# Patient Record
Sex: FEMALE | Race: WHITE | NOT HISPANIC OR LATINO | URBAN - METROPOLITAN AREA
[De-identification: names, ages, dates, MRNs, and addresses within clinical notes are randomized per-mention and may not be internally consistent; named-entity substitution may affect disease eponyms.]

---

## 2017-04-21 ENCOUNTER — OUTPATIENT (OUTPATIENT)
Dept: OUTPATIENT SERVICES | Facility: HOSPITAL | Age: 82
LOS: 1 days | Discharge: HOME | End: 2017-04-21

## 2017-06-27 DIAGNOSIS — E89.0 POSTPROCEDURAL HYPOTHYROIDISM: ICD-10-CM

## 2017-06-27 DIAGNOSIS — E78.5 HYPERLIPIDEMIA, UNSPECIFIED: ICD-10-CM

## 2017-06-27 DIAGNOSIS — E55.9 VITAMIN D DEFICIENCY, UNSPECIFIED: ICD-10-CM

## 2017-06-27 DIAGNOSIS — Z79.899 OTHER LONG TERM (CURRENT) DRUG THERAPY: ICD-10-CM

## 2017-06-27 DIAGNOSIS — E10.9 TYPE 1 DIABETES MELLITUS WITHOUT COMPLICATIONS: ICD-10-CM

## 2017-06-27 DIAGNOSIS — D64.9 ANEMIA, UNSPECIFIED: ICD-10-CM

## 2017-06-27 DIAGNOSIS — M10.9 GOUT, UNSPECIFIED: ICD-10-CM

## 2017-06-27 DIAGNOSIS — N39.0 URINARY TRACT INFECTION, SITE NOT SPECIFIED: ICD-10-CM

## 2017-06-27 DIAGNOSIS — D51.8 OTHER VITAMIN B12 DEFICIENCY ANEMIAS: ICD-10-CM

## 2018-03-23 ENCOUNTER — OUTPATIENT (OUTPATIENT)
Dept: OUTPATIENT SERVICES | Facility: HOSPITAL | Age: 83
LOS: 1 days | Discharge: HOME | End: 2018-03-23

## 2018-03-23 DIAGNOSIS — N39.0 URINARY TRACT INFECTION, SITE NOT SPECIFIED: ICD-10-CM

## 2018-03-23 DIAGNOSIS — E78.5 HYPERLIPIDEMIA, UNSPECIFIED: ICD-10-CM

## 2018-03-23 DIAGNOSIS — E10.9 TYPE 1 DIABETES MELLITUS WITHOUT COMPLICATIONS: ICD-10-CM

## 2018-03-23 DIAGNOSIS — E89.0 POSTPROCEDURAL HYPOTHYROIDISM: ICD-10-CM

## 2018-03-23 DIAGNOSIS — E55.9 VITAMIN D DEFICIENCY, UNSPECIFIED: ICD-10-CM

## 2018-03-23 DIAGNOSIS — D64.9 ANEMIA, UNSPECIFIED: ICD-10-CM

## 2018-03-23 DIAGNOSIS — D51.8 OTHER VITAMIN B12 DEFICIENCY ANEMIAS: ICD-10-CM

## 2018-03-23 DIAGNOSIS — Z79.899 OTHER LONG TERM (CURRENT) DRUG THERAPY: ICD-10-CM

## 2018-03-23 DIAGNOSIS — M10.9 GOUT, UNSPECIFIED: ICD-10-CM

## 2019-06-05 ENCOUNTER — OUTPATIENT (OUTPATIENT)
Dept: OUTPATIENT SERVICES | Facility: HOSPITAL | Age: 84
LOS: 1 days | Discharge: HOME | End: 2019-06-05

## 2019-06-05 DIAGNOSIS — N39.0 URINARY TRACT INFECTION, SITE NOT SPECIFIED: ICD-10-CM

## 2019-06-05 DIAGNOSIS — E87.0 HYPEROSMOLALITY AND HYPERNATREMIA: ICD-10-CM

## 2019-06-05 DIAGNOSIS — E78.5 HYPERLIPIDEMIA, UNSPECIFIED: ICD-10-CM

## 2019-06-05 DIAGNOSIS — M10.9 GOUT, UNSPECIFIED: ICD-10-CM

## 2019-06-05 DIAGNOSIS — Z79.899 OTHER LONG TERM (CURRENT) DRUG THERAPY: ICD-10-CM

## 2019-06-05 DIAGNOSIS — D64.9 ANEMIA, UNSPECIFIED: ICD-10-CM

## 2019-06-05 DIAGNOSIS — D51.8 OTHER VITAMIN B12 DEFICIENCY ANEMIAS: ICD-10-CM

## 2019-06-05 DIAGNOSIS — Z02.9 ENCOUNTER FOR ADMINISTRATIVE EXAMINATIONS, UNSPECIFIED: ICD-10-CM

## 2019-07-01 ENCOUNTER — OUTPATIENT (OUTPATIENT)
Dept: OUTPATIENT SERVICES | Facility: HOSPITAL | Age: 84
LOS: 1 days | Discharge: HOME | End: 2019-07-01

## 2019-07-01 DIAGNOSIS — D51.8 OTHER VITAMIN B12 DEFICIENCY ANEMIAS: ICD-10-CM

## 2019-07-01 DIAGNOSIS — E78.5 HYPERLIPIDEMIA, UNSPECIFIED: ICD-10-CM

## 2019-07-01 DIAGNOSIS — E55.9 VITAMIN D DEFICIENCY, UNSPECIFIED: ICD-10-CM

## 2019-07-01 DIAGNOSIS — E10.9 TYPE 1 DIABETES MELLITUS WITHOUT COMPLICATIONS: ICD-10-CM

## 2019-07-01 DIAGNOSIS — M10.9 GOUT, UNSPECIFIED: ICD-10-CM

## 2019-07-01 DIAGNOSIS — Z79.899 OTHER LONG TERM (CURRENT) DRUG THERAPY: ICD-10-CM

## 2019-07-01 DIAGNOSIS — E89.0 POSTPROCEDURAL HYPOTHYROIDISM: ICD-10-CM

## 2019-07-01 DIAGNOSIS — D64.9 ANEMIA, UNSPECIFIED: ICD-10-CM

## 2021-01-01 ENCOUNTER — INPATIENT (INPATIENT)
Facility: HOSPITAL | Age: 86
LOS: 0 days | End: 2021-02-21
Attending: INTERNAL MEDICINE | Admitting: INTERNAL MEDICINE
Payer: MEDICARE

## 2021-01-01 VITALS — OXYGEN SATURATION: 100 % | HEART RATE: 105 BPM

## 2021-01-01 VITALS — RESPIRATION RATE: 16 BRPM | OXYGEN SATURATION: 100 % | HEART RATE: 110 BPM

## 2021-01-01 DIAGNOSIS — E89.0 POSTPROCEDURAL HYPOTHYROIDISM: Chronic | ICD-10-CM

## 2021-01-01 DIAGNOSIS — Z95.5 PRESENCE OF CORONARY ANGIOPLASTY IMPLANT AND GRAFT: Chronic | ICD-10-CM

## 2021-01-01 DIAGNOSIS — R09.89 OTHER SPECIFIED SYMPTOMS AND SIGNS INVOLVING THE CIRCULATORY AND RESPIRATORY SYSTEMS: ICD-10-CM

## 2021-01-01 DIAGNOSIS — Z95.828 PRESENCE OF OTHER VASCULAR IMPLANTS AND GRAFTS: Chronic | ICD-10-CM

## 2021-01-01 LAB
ALBUMIN SERPL ELPH-MCNC: 3.1 G/DL — LOW (ref 3.5–5.2)
ALP SERPL-CCNC: 93 U/L — SIGNIFICANT CHANGE UP (ref 30–115)
ALT FLD-CCNC: <5 U/L — SIGNIFICANT CHANGE UP (ref 0–41)
ANION GAP SERPL CALC-SCNC: 13 MMOL/L — SIGNIFICANT CHANGE UP (ref 7–14)
APAP SERPL-MCNC: <5 UG/ML — LOW (ref 10–30)
APPEARANCE UR: CLEAR — SIGNIFICANT CHANGE UP
APTT BLD: 22.9 SEC — CRITICAL LOW (ref 27–39.2)
AST SERPL-CCNC: 24 U/L — SIGNIFICANT CHANGE UP (ref 0–41)
BACTERIA # UR AUTO: NEGATIVE — SIGNIFICANT CHANGE UP
BASE EXCESS BLDV CALC-SCNC: 3.7 MMOL/L — HIGH (ref -2–2)
BASOPHILS # BLD AUTO: 0.09 K/UL — SIGNIFICANT CHANGE UP (ref 0–0.2)
BASOPHILS NFR BLD AUTO: 0.2 % — SIGNIFICANT CHANGE UP (ref 0–1)
BILIRUB SERPL-MCNC: 0.4 MG/DL — SIGNIFICANT CHANGE UP (ref 0.2–1.2)
BILIRUB UR-MCNC: NEGATIVE — SIGNIFICANT CHANGE UP
BUN SERPL-MCNC: 64 MG/DL — CRITICAL HIGH (ref 10–20)
CA-I SERPL-SCNC: 1.54 MMOL/L — HIGH (ref 1.12–1.3)
CALCIUM SERPL-MCNC: 11.7 MG/DL — HIGH (ref 8.5–10.1)
CHLORIDE SERPL-SCNC: 108 MMOL/L — SIGNIFICANT CHANGE UP (ref 98–110)
CK SERPL-CCNC: 366 U/L — HIGH (ref 0–225)
CO2 SERPL-SCNC: 25 MMOL/L — SIGNIFICANT CHANGE UP (ref 17–32)
COLOR SPEC: YELLOW — SIGNIFICANT CHANGE UP
CREAT SERPL-MCNC: 1.5 MG/DL — SIGNIFICANT CHANGE UP (ref 0.7–1.5)
DIFF PNL FLD: SIGNIFICANT CHANGE UP
EOSINOPHIL # BLD AUTO: 0.01 K/UL — SIGNIFICANT CHANGE UP (ref 0–0.7)
EOSINOPHIL NFR BLD AUTO: 0 % — SIGNIFICANT CHANGE UP (ref 0–8)
EPI CELLS # UR: 1 /HPF — SIGNIFICANT CHANGE UP (ref 0–5)
GAS PNL BLDV: 148 MMOL/L — HIGH (ref 136–145)
GAS PNL BLDV: SIGNIFICANT CHANGE UP
GLUCOSE BLDC GLUCOMTR-MCNC: 108 MG/DL — HIGH (ref 70–99)
GLUCOSE SERPL-MCNC: 118 MG/DL — HIGH (ref 70–99)
GLUCOSE UR QL: NEGATIVE — SIGNIFICANT CHANGE UP
HCO3 BLDV-SCNC: 33 MMOL/L — HIGH (ref 22–29)
HCT VFR BLD CALC: 37.8 % — SIGNIFICANT CHANGE UP (ref 37–47)
HCT VFR BLDA CALC: 35.6 % — SIGNIFICANT CHANGE UP (ref 34–44)
HGB BLD CALC-MCNC: 11.6 G/DL — LOW (ref 14–18)
HGB BLD-MCNC: 11 G/DL — LOW (ref 12–16)
HYALINE CASTS # UR AUTO: 15 /LPF — HIGH (ref 0–7)
IMM GRANULOCYTES NFR BLD AUTO: 0.8 % — HIGH (ref 0.1–0.3)
INR BLD: 1.2 RATIO — SIGNIFICANT CHANGE UP (ref 0.65–1.3)
KETONES UR-MCNC: NEGATIVE — SIGNIFICANT CHANGE UP
LACTATE BLDV-MCNC: 1.7 MMOL/L — HIGH (ref 0.5–1.6)
LACTATE SERPL-SCNC: 2.1 MMOL/L — HIGH (ref 0.7–2)
LEUKOCYTE ESTERASE UR-ACNC: NEGATIVE — SIGNIFICANT CHANGE UP
LYMPHOCYTES # BLD AUTO: 17.16 K/UL — HIGH (ref 1.2–3.4)
LYMPHOCYTES # BLD AUTO: 45.6 % — SIGNIFICANT CHANGE UP (ref 20.5–51.1)
MCHC RBC-ENTMCNC: 23.9 PG — LOW (ref 27–31)
MCHC RBC-ENTMCNC: 29.1 G/DL — LOW (ref 32–37)
MCV RBC AUTO: 82 FL — SIGNIFICANT CHANGE UP (ref 81–99)
MONOCYTES # BLD AUTO: 0.9 K/UL — HIGH (ref 0.1–0.6)
MONOCYTES NFR BLD AUTO: 2.4 % — SIGNIFICANT CHANGE UP (ref 1.7–9.3)
NEUTROPHILS # BLD AUTO: 19.18 K/UL — HIGH (ref 1.4–6.5)
NEUTROPHILS NFR BLD AUTO: 51 % — SIGNIFICANT CHANGE UP (ref 42.2–75.2)
NITRITE UR-MCNC: NEGATIVE — SIGNIFICANT CHANGE UP
NRBC # BLD: 0 /100 WBCS — SIGNIFICANT CHANGE UP (ref 0–0)
PCO2 BLDV: 75 MMHG — HIGH (ref 41–51)
PH BLDV: 7.25 — LOW (ref 7.26–7.43)
PH UR: 5.5 — SIGNIFICANT CHANGE UP (ref 5–8)
PLATELET # BLD AUTO: 65 K/UL — LOW (ref 130–400)
PO2 BLDV: 34 MMHG — SIGNIFICANT CHANGE UP (ref 20–40)
POTASSIUM BLDV-SCNC: 4.6 MMOL/L — SIGNIFICANT CHANGE UP (ref 3.3–5.6)
POTASSIUM SERPL-MCNC: 5.1 MMOL/L — HIGH (ref 3.5–5)
POTASSIUM SERPL-SCNC: 5.1 MMOL/L — HIGH (ref 3.5–5)
PROT SERPL-MCNC: 6.2 G/DL — SIGNIFICANT CHANGE UP (ref 6–8)
PROT UR-MCNC: ABNORMAL
PROTHROM AB SERPL-ACNC: 13.8 SEC — HIGH (ref 9.95–12.87)
RBC # BLD: 4.61 M/UL — SIGNIFICANT CHANGE UP (ref 4.2–5.4)
RBC # FLD: 17.3 % — HIGH (ref 11.5–14.5)
RBC CASTS # UR COMP ASSIST: 7 /HPF — HIGH (ref 0–4)
SALICYLATES SERPL-MCNC: <0.3 MG/DL — LOW (ref 4–30)
SAO2 % BLDV: 45 % — SIGNIFICANT CHANGE UP
SARS-COV-2 RNA SPEC QL NAA+PROBE: SIGNIFICANT CHANGE UP
SODIUM SERPL-SCNC: 146 MMOL/L — SIGNIFICANT CHANGE UP (ref 135–146)
SP GR SPEC: 1.03 — SIGNIFICANT CHANGE UP (ref 1.01–1.03)
TROPONIN T SERPL-MCNC: 0.15 NG/ML — CRITICAL HIGH
UROBILINOGEN FLD QL: SIGNIFICANT CHANGE UP
WBC # BLD: 37.65 K/UL — HIGH (ref 4.8–10.8)
WBC # FLD AUTO: 37.65 K/UL — HIGH (ref 4.8–10.8)
WBC UR QL: 4 /HPF — SIGNIFICANT CHANGE UP (ref 0–5)

## 2021-01-01 PROCEDURE — 71045 X-RAY EXAM CHEST 1 VIEW: CPT | Mod: 26

## 2021-01-01 PROCEDURE — 99291 CRITICAL CARE FIRST HOUR: CPT | Mod: 25,GC

## 2021-01-01 PROCEDURE — 31500 INSERT EMERGENCY AIRWAY: CPT | Mod: GC

## 2021-01-01 PROCEDURE — 99222 1ST HOSP IP/OBS MODERATE 55: CPT

## 2021-01-01 PROCEDURE — 93010 ELECTROCARDIOGRAM REPORT: CPT

## 2021-01-01 PROCEDURE — 70450 CT HEAD/BRAIN W/O DYE: CPT | Mod: 26

## 2021-01-01 PROCEDURE — 93925 LOWER EXTREMITY STUDY: CPT | Mod: 26

## 2021-01-01 RX ORDER — SODIUM CHLORIDE 9 MG/ML
1000 INJECTION, SOLUTION INTRAVENOUS ONCE
Refills: 0 | Status: COMPLETED | OUTPATIENT
Start: 2021-01-01 | End: 2021-01-01

## 2021-01-01 RX ORDER — ACETAMINOPHEN 500 MG
650 TABLET ORAL ONCE
Refills: 0 | Status: COMPLETED | OUTPATIENT
Start: 2021-01-01 | End: 2021-01-01

## 2021-01-01 RX ORDER — VANCOMYCIN HCL 1 G
1000 VIAL (EA) INTRAVENOUS ONCE
Refills: 0 | Status: COMPLETED | OUTPATIENT
Start: 2021-01-01 | End: 2021-01-01

## 2021-01-01 RX ORDER — MIDAZOLAM HYDROCHLORIDE 1 MG/ML
0.02 INJECTION, SOLUTION INTRAMUSCULAR; INTRAVENOUS
Qty: 100 | Refills: 0 | Status: DISCONTINUED | OUTPATIENT
Start: 2021-01-01 | End: 2021-01-01

## 2021-01-01 RX ORDER — HEPARIN SODIUM 5000 [USP'U]/ML
5000 INJECTION INTRAVENOUS; SUBCUTANEOUS EVERY 8 HOURS
Refills: 0 | Status: DISCONTINUED | OUTPATIENT
Start: 2021-01-01 | End: 2021-01-01

## 2021-01-01 RX ORDER — ACETAMINOPHEN 500 MG
650 TABLET ORAL EVERY 6 HOURS
Refills: 0 | Status: DISCONTINUED | OUTPATIENT
Start: 2021-01-01 | End: 2021-01-01

## 2021-01-01 RX ORDER — ROCURONIUM BROMIDE 10 MG/ML
100 VIAL (ML) INTRAVENOUS ONCE
Refills: 0 | Status: COMPLETED | OUTPATIENT
Start: 2021-01-01 | End: 2021-01-01

## 2021-01-01 RX ORDER — ETOMIDATE 2 MG/ML
20 INJECTION INTRAVENOUS ONCE
Refills: 0 | Status: COMPLETED | OUTPATIENT
Start: 2021-01-01 | End: 2021-01-01

## 2021-01-01 RX ORDER — CHLORHEXIDINE GLUCONATE 213 G/1000ML
1 SOLUTION TOPICAL
Refills: 0 | Status: DISCONTINUED | OUTPATIENT
Start: 2021-01-01 | End: 2021-01-01

## 2021-01-01 RX ORDER — AZTREONAM 2 G
2000 VIAL (EA) INJECTION ONCE
Refills: 0 | Status: COMPLETED | OUTPATIENT
Start: 2021-01-01 | End: 2021-01-01

## 2021-01-01 RX ORDER — FENTANYL CITRATE 50 UG/ML
0.5 INJECTION INTRAVENOUS
Qty: 2500 | Refills: 0 | Status: DISCONTINUED | OUTPATIENT
Start: 2021-01-01 | End: 2021-01-01

## 2021-01-01 RX ORDER — CEFTRIAXONE 500 MG/1
1000 INJECTION, POWDER, FOR SOLUTION INTRAMUSCULAR; INTRAVENOUS ONCE
Refills: 0 | Status: DISCONTINUED | OUTPATIENT
Start: 2021-01-01 | End: 2021-01-01

## 2021-01-01 RX ORDER — HEPARIN SODIUM 5000 [USP'U]/ML
500 INJECTION INTRAVENOUS; SUBCUTANEOUS
Qty: 25000 | Refills: 0 | Status: DISCONTINUED | OUTPATIENT
Start: 2021-01-01 | End: 2021-01-01

## 2021-01-01 RX ORDER — NOREPINEPHRINE BITARTRATE/D5W 8 MG/250ML
0.05 PLASTIC BAG, INJECTION (ML) INTRAVENOUS
Qty: 8 | Refills: 0 | Status: DISCONTINUED | OUTPATIENT
Start: 2021-01-01 | End: 2021-01-01

## 2021-01-01 RX ADMIN — SODIUM CHLORIDE 1000 MILLILITER(S): 9 INJECTION, SOLUTION INTRAVENOUS at 16:45

## 2021-01-01 RX ADMIN — FENTANYL CITRATE 2.27 MICROGRAM(S)/KG/HR: 50 INJECTION INTRAVENOUS at 17:03

## 2021-01-01 RX ADMIN — ETOMIDATE 20 MILLIGRAM(S): 2 INJECTION INTRAVENOUS at 17:03

## 2021-01-01 RX ADMIN — Medication 250 MILLIGRAM(S): at 17:38

## 2021-01-01 RX ADMIN — MIDAZOLAM HYDROCHLORIDE 0.91 MG/KG/HR: 1 INJECTION, SOLUTION INTRAMUSCULAR; INTRAVENOUS at 17:03

## 2021-01-01 RX ADMIN — Medication 650 MILLIGRAM(S): at 16:45

## 2021-01-01 RX ADMIN — Medication 100 MILLIGRAM(S): at 19:05

## 2021-01-01 RX ADMIN — Medication 4.69 MICROGRAM(S)/KG/MIN: at 16:45

## 2021-01-01 RX ADMIN — SODIUM CHLORIDE 1000 MILLILITER(S): 9 INJECTION, SOLUTION INTRAVENOUS at 20:37

## 2021-01-01 RX ADMIN — Medication 100 MILLIGRAM(S): at 17:04

## 2021-02-20 NOTE — ED PROVIDER NOTE - CLINICAL SUMMARY MEDICAL DECISION MAKING FREE TEXT BOX
86 yo F PMH breast CA, CAD, HTN, HLD hypothyroid BIBA for decreased PO intake and lethargy. History later obtained from daughter who states patient was sating in the 70s at home. VS reviewed. Patient found to be in hypercapneic respiratory failure and was intubated. Patient found to have septic shock 2/2 pneumonia sepsis fluids and antibiotics given. Spoke with ICU fellow who approved admission to ICU.

## 2021-02-20 NOTE — ED PROVIDER NOTE - PHYSICAL EXAMINATION
CONSTITUTIONAL: chronically ill appearing, toxic appearing, not talking  SKIN: warm, dry, no rash, cap refill < 2 seconds  HEENT: normocephalic, atraumatic, no conjunctival erythema, dry mucous membranes, patent airway  NECK: supple  CV:  tachycardic rate, regular rhythm, 2+ radial pulses bilaterally  RESP: no wheezes, no rales, no rhonchi, normal work of breathing  ABD: soft, nontender, nondistended, no rebound, no guarding  MSK: normal ROM, no cyanosis, nonpitting edema to bilateral feet  NEURO: awake, not responding to questions, localizes to pain  PSYCH: unable to assess

## 2021-02-20 NOTE — H&P ADULT - ASSESSMENT
86y/o F with PMH CAD, PAD, HTN, deaf, hx of breast CA, hypothyroidism presenting to ED with FTT, intubated, and found to have gross ischemia of R leg.    #Acute hypoxic respiratory failure  #failure to thrive  #R leg ischemia  - per family they did not wish her intubated  - R leg from bypass down occluded w/ b/l DVT  - plts 65, elderly, would already be high risk for heparin  - Discussed with vascular - NO INTERVENTION, pt unlikely to survive any major invasive intervention   - per family, they would NOT want any further interventions, would like her to pass peacefully  - daughters to visit, then pt to be extubated  - DNR, to be extubated & CMO.    #other chronic conditions- hold all meds, CMO    #MISC  - DVT ppx: hold  - GI ppx: hold  - no diet, bedrest  - palliative extubation pending  - DNR/no further escalation of care 84y/o F with PMH CAD, PAD, HTN, deaf, hx of breast CA, hypothyroidism presenting to ED with FTT, intubated, and found to have gross ischemia of R leg.    #Acute hypoxic respiratory failure  #failure to thrive  #R leg ischemia & b/l DVT  - per family they did not wish her intubated  - R leg from bypass down occluded w/ b/l DVT likely due to cancer  - plts 65, elderly, would already be high risk for heparin  - Discussed with vascular - NO INTERVENTION, pt unlikely to survive any major invasive intervention   - per family, they would NOT want any further interventions, would like her to pass peacefully  - daughters to visit, then pt to be extubated  - DNR, to be extubated & CMO.    #other chronic conditions- hold all meds, CMO    #MISC  - DVT ppx: hold  - GI ppx: hold  - no diet, bedrest  - palliative extubation pending  - DNR/no further escalation of care

## 2021-02-20 NOTE — ED PROVIDER NOTE - CARE PLAN
Principal Discharge DX:	Septic shock  Secondary Diagnosis:	Pneumonia  Secondary Diagnosis:	RAJ (acute kidney injury)  Secondary Diagnosis:	Elevated troponin

## 2021-02-20 NOTE — ED PROVIDER NOTE - ATTENDING CONTRIBUTION TO CARE
84 yo F PMH breast CA, CAD, HTN, HLD hypothyroid BIBA for decreased PO intake and lethargy. History later obtained from daughter who states patient was sating in the 70s at home.  On exam  CONSTITUTIONAL: WA / WN / NAD  HEAD: NCAT  EYES: PERRL; EOMI;   ENT: Normal pharynx; mucous membranes dry  NECK: Supple; no meningeal signs  CARD: tachycardic nl S1/S2; no M/R/G.   RESP: Respiratory rate and effort are normal; b/l crackles  ABD: Soft, NT ND   MSK/EXT: No gross deformities; full range of motion.  SKIN: Warm and dry;   NEURO: AAOx0 86 yo F PMH breast CA, CAD, HTN, HLD hypothyroid BIBA for decreased PO intake and lethargy. History later obtained from daughter who states patient was sating in the 70s at home.  On exam  CONSTITUTIONAL: WA / WN / NAD  HEAD: NCAT  EYES: PERRL; EOMI;   ENT: Normal pharynx; mucous membranes dry  NECK: Supple; no meningeal signs  CARD: tachycardic nl S1/S2; no M/R/G.   RESP:  b/l crackles  ABD: Soft, NT ND   MSK/EXT: No gross deformities + right lower extremity cyanotic (as per EMS has been worked up in the past for this)  SKIN: Warm and dry;   NEURO: AAOx0

## 2021-02-20 NOTE — H&P ADULT - NSHPPHYSICALEXAM_GEN_ALL_CORE
GENERAL: thin elderly F intubated sedated  HEENT:  Atraumatic, Normocephalic; conjunctiva pink, sclera white  CHEST/LUNG: Clear to auscultation bilaterally; No wheeze/crackles  HEART: Regular rate and rhythm; No murmurs  ABDOMEN: Soft, Nontender, Nondistended; Bowel sounds present  EXTREMITIES:  no pulses b/l, R leg blue and mottled and cyanotic and cold from knee down, L foot also cooler to touch   	- unable to doppler dorsalis pedis pulse  NEUROLOGY: unable to assess GENERAL: thin elderly F intubated sedated  HEENT:  Atraumatic, Normocephalic; conjunctiva pink, sclera white  CHEST/LUNG: Clear to auscultation bilaterally; No wheeze/crackles  HEART: Regular rate and rhythm; No murmurs  ABDOMEN: Soft, Nontender, Nondistended; Bowel sounds present  EXTREMITIES:  no pulses b/l, R leg blue and mottled and cyanotic and cold from knee down, L foot also cooler to touch   	- unable to doppler dorsalis pedis pulse  NEUROLOGY: unable to assess  SKIN: diffuse dry and fungating masses over chest wall, b/l masectomy

## 2021-02-20 NOTE — CONSULT NOTE ADULT - ASSESSMENT
Assessment: 86y/o F with PMH of deaf, HTN, HLD, hx of breast CA, CAD x 9 PCI, hypothyroidism, & previous fem-fem bypass presented to ED from home for lethargy and dec PO intake 1 week, found to have completely cold RLE. Leg is without flow, edemaous.    Plan:  given patient's comorbidities, occluded fem-fem bypass & lab abnormalities, which includes WBC of 37, Plt of 60s, intubated on pressors, patient is an extremely poor surgical candidate  no vascular intervention recommended  mgmt as per primary team  discussed with Dr. Bethea

## 2021-02-20 NOTE — ED ADULT TRIAGE NOTE - CHIEF COMPLAINT QUOTE
pt sent in [facility] pt has breast ca with mets hasn't eat or drink within last week with deteriorating CONDITION

## 2021-02-20 NOTE — CONSULT NOTE ADULT - ATTENDING COMMENTS
85 year old with PVD and cold foot    Pts with thrombosed fem fem  pt is HD unstable for intervention at this time

## 2021-02-20 NOTE — H&P ADULT - HISTORY OF PRESENT ILLNESS
86y/o F with PMH of deaf, HTN, HLD, hx of breast CA, CAD x 9 PCI, hypothyroidism presents to ED from home for lethargy and dec PO intake 1 week. Per daughter France she is normally AAOx3 and can ambulate w/ walker. Pulse ox with EMS was 70s so brought to ED. Family denied recent fever, cough, SOB, abd pain, N/V/D, sick contacts.    In ED: T101, HR 95, BP 96/51, RR18, SpO2 100% intubated. In ED pt intubated for airway protection.   Labs significant for WBC 37, Cr 1.5, trop 0.15. UA neg.  CXR possible PNA.   Of note: also found to have mottled cold R leg.       	 86y/o F with PMH of deaf, HTN, HLD, hx of breast CA, CAD x 9 PCI, hypothyroidism presents to ED from home for lethargy and dec PO intake 1 week. Per daughter [place] she is normally AAOx3 and can ambulate w/ walker. Pulse ox with EMS was 70s so brought to ED. Family denied recent fever, cough, SOB, abd pain, N/V/D, sick contacts, CP.     In ED: T101, HR 95, BP 96/51, RR18, SpO2 100% intubated. In ED pt intubated for airway protection.   Labs significant for WBC 37, Cr 1.5, trop 0.15. UA neg.  CXR possible PNA.   Of note: also found to have mottled cold R leg.       	 86y/o F with PMH of deaf, HTN, HLD, active stage 4 breast CA on PO hormone tx, CAD x 9 PCI, hypothyroidism presents to ED from home for lethargy and dec PO intake 1 week. Per daughter [place] she is normally AAOx3 and can ambulate w/ walker. Pulse ox with EMS was 70s so brought to ED. Family denied recent fever, cough, SOB, abd pain, N/V/D, sick contacts, CP. Patient was complaining of R foot pain for a while and was pending a visit to the podiatrist, the family did note her R foot was cooler and bluish but not whole leg. Patient was recently found to have chest wall recurrence of breast cancer for which she was taking oral hormone tx.     In ED: T101, HR 95, BP 96/51, RR18, SpO2 100% intubated. In ED pt intubated for airway protection.   Labs significant for WBC 37, Cr 1.5, trop 0.15. UA neg.  CXR possible PNA.   Of note: also found to have mottled cold R leg.

## 2021-02-20 NOTE — H&P ADULT - NSHPLABSRESULTS_GEN_ALL_CORE
< from: CT Head No Cont (02.20.21 @ 15:57) >    IMPRESSION:  1.  No acute intracranial pathology.  2.  Severe chronic microvascular ischemic changes.    < end of copied text >

## 2021-02-20 NOTE — CONSULT NOTE ADULT - SUBJECTIVE AND OBJECTIVE BOX
ENID MONTANA [id number]  85y Female    HPI:  86y/o F with PMH of deaf, HTN, HLD, hx of breast CA, CAD x 9 PCI, hypothyroidism, & previous fem-fem bypass presented to ED from home for lethargy and dec PO intake 1 week. Per daughter France she is normally AAOx3 and can ambulate w/ walker. Pulse ox with EMS was 70s so brought to ED. Family mentions having noticed th RLE getting more swollen over the last week, but patient did not complain of pain/discoloration. Family denied recent fever, cough, SOB, abd pain, N/V/D, sick contacts, CP. Consulted for cold RLE, found to have a completely occluded fem-fem bypass & absent flow throughout entire right leg.     In ED: T101, HR 95, BP 96/51, RR18, SpO2 100% intubated. In ED pt intubated for airway protection.   Labs significant for WBC 37, Cr 1.5, trop 0.15. UA neg.  CXR possible PNA.   Of note: also found to have mottled cold R leg.     PAST MEDICAL & SURGICAL HISTORY:  Deafness  Breast cancer  Hypothyroidism  Coronary artery disease  Hyperlipidemia  Hypertension  S/P femoral-popliteal bypass surgery  History of partial thyroidectomy  S/P coronary artery stent placement    MEDICATIONS  (STANDING):  fentaNYL   Infusion. 0.5 MICROgram(s)/kG/Hr (2.27 mL/Hr) IV Continuous <Continuous>  midazolam Infusion 0.02 mG/kG/Hr (0.91 mL/Hr) IV Continuous <Continuous>  norepinephrine Infusion 0.05 MICROgram(s)/kG/Min (4.69 mL/Hr) IV Continuous <Continuous>    MEDICATIONS  (PRN):    Allergies    latex (Unknown)  penicillin (Unknown)  sulfa drugs (Unknown)  tetracycline (Unknown)    Intolerances    REVIEW OF SYSTEMS    [x] A ten-point review of systems was otherwise negative except as noted.  [ ] Due to altered mental status/intubation, subjective information were not able to be obtained from the patient. History was obtained, to the extent possible, from review of the chart and collateral sources of information.    Vital Signs Last 24 Hrs  T(C): 36.9 (2021 22:00), Max: 38.3 (2021 16:45)  T(F): 98.5 (2021 22:00), Max: 101 (2021 16:45)  HR: 96 (2021 22:00) (95 - 105)  BP: 96/42 (2021 22:00) (65/30 - 145/62)  BP(mean): 54 (2021 22:00) (54 - 54)  RR: 18 (2021 20:40) (18 - 26)  SpO2: 100% (2021 22:00) (99% - 100%)    PHYSICAL EXAM:  GENERAL: NAD, intubated  CHEST/LUNG: symmetric chest wall expansion, radiated skin on chest wall  HEART: Regular rate and rhythm, hypotensive  ABDOMEN: Soft, Nontender, Nondistended;   EXTREMITIES:  RLE with absent pulses, none found on doppler. fem pulse also absent. RLE is edematous, discolored and cold    LABS:  Labs:  CAPILLARY BLOOD GLUCOSE  POCT Blood Glucose.: 108 mg/dL (2021 21:19)                          11.0   37.65 )-----------( 65       ( 2021 15:30 )             37.8       Auto Neutrophil %: 51.0 % (21 @ 15:30)  Auto Immature Granulocyte %: 0.8 % (21 @ 15:30)        146  |  108  |  64<HH>  ----------------------------<  118<H>  5.1<H>   |  25  |  1.5    Calcium, Total Serum: 11.7 mg/dL (21 @ 15:30)    LFTs:             6.2  | 0.4  | 24       ------------------[93      ( 2021 15:30 )  3.1  | x    | <5           Blood Gas Arterial, Lactate: 1.9 mmoL/L (21 @ 21:36)  Blood Gas Arterial, Lactate: 2.2 mmoL/L (21 @ 17:24)  Blood Gas Venous - Lactate: 1.7 mmoL/L (21 @ 15:04)    ABG - ( 2021 21:36 )  pH: 7.49  /  pCO2: 35    /  pO2: 102   / HCO3: 27    / Base Excess: 3.3   /  SaO2: 98        ABG - ( 2021 17:24 )  pH: 7.58  /  pCO2: 26    /  pO2: 134   / HCO3: 25    / Base Excess: 3.1   /  SaO2: 100       Coags:     13.80  ----< 1.20    ( 2021 21:48 )     x         CARDIAC MARKERS ( 2021 15:30 )  x     / 0.15 ng/mL / x     / x     / x        Urinalysis Basic - ( 2021 15:04 )    Color: Yellow / Appearance: Clear / S.026 / pH: x  Gluc: x / Ketone: Negative  / Bili: Negative / Urobili: <2 mg/dL   Blood: x / Protein: 30 mg/dL / Nitrite: Negative   Leuk Esterase: Negative / RBC: 7 /HPF / WBC 4 /HPF   Sq Epi: x / Non Sq Epi: 1 /HPF / Bacteria: Negative    RADIOLOGY & ADDITIONAL STUDIES:  < from: CT Head No Cont (21 @ 15:57) >  IMPRESSION:  1.  No acute intracranial pathology.  2.  Severe chronic microvascular ischemic changes.    < end of copied text >

## 2021-02-20 NOTE — ED PROVIDER NOTE - OBJECTIVE STATEMENT
84 yo F with PMHx of breast cancer, CAD s/p PCIx9, deafness, HLD, HTN, hypothyroidism who was BIBEMS from home for lethargy and decreased po intake x1 wk. Per daughter, normally A&Ox3 and ambulatory with walker. Pulse ox at home     Daughter France Hernandez [phone] 84 yo F with PMHx of breast cancer, CAD s/p PCIx9, deafness, HLD, HTN, hypothyroidism who was BIBEMS from home for lethargy and decreased po intake x1 wk. Per daughter, normally A&Ox3 and ambulatory with walker. Pulse ox at home down to 70s so brought to ED. No recent fever, cough, abd pain, nausea, vomiting, diarrhea, sick contact. Full code. In ED, pt not talking.    Daughter France Hernandez [phone]

## 2021-02-20 NOTE — H&P ADULT - NSICDXPASTMEDICALHX_GEN_ALL_CORE_FT
PAST MEDICAL HISTORY:  Breast cancer     Coronary artery disease     Deafness     Hyperlipidemia     Hypertension     Hypothyroidism

## 2021-02-20 NOTE — ED PROVIDER NOTE - PROGRESS NOTE DETAILS
TC: Spoke to daughter France Regalado [phone] who confirmed pt is full code for now. ATTENDING NOTE:   86 yo F PMH breast CA, CAD, HTN, HLD hypothyroid BIBA for decreased PO intake and lethargy. SR: patient confirmed full code, CO2 elevated will intubate patient for AMS/ hypercapneic respiratory failure. TC: Labs notable for WBC 37, BUN/Cr 67/1.5, trop 0.15. Cxr with R infiltrate. CT head negative. Ua negative. Covid negative. Given vancomycin, aztreonam. TC: Approved to ICU by Dr. Mayberry. TC: 84 yo F with PMHx of breast cancer, CAD s/p PCIx9, deafness, HLD, HTN, hypothyroidism who presents with lethargy, hypoxia, decreased po intake x1 wk. Here in ED, tachycardic, on 15L NRB, normotensive, rectal temp 101. Pt altered, not responding to questions. Spoke to daughter France Regalado [phone] who confirmed pt is full code for now. Ordered labs, ekg, cxr, urine, CT head. Given IVF.

## 2021-02-20 NOTE — PATIENT PROFILE ADULT - STATED REASON FOR ADMISSION
pt sent in [facility] pt has breast ca with mets hasn't eat or drink within last week with deteriorating CONDITION  altered mental status

## 2021-02-20 NOTE — GOALS OF CARE CONVERSATION - ADVANCED CARE PLANNING - CONVERSATION DETAILS
Spoke to both daughters France Robison - they expressed that she never wanted to be intubated.  They do not wish for invasive surgical measures and they wish to have her extubated after they see her.    They do not want any suffering.   DNR now, no increased measures - no central lines.  No surgeries.    Family to visit, then tube to be removed and CMO.

## 2021-02-21 NOTE — DISCHARGE NOTE FOR THE EXPIRED PATIENT - HOSPITAL COURSE
86y/o F with PMH of deaf, HTN, HLD, active stage 4 breast CA on PO hormone tx, CAD x 9 PCI, hypothyroidism presents to ED from home for lethargy and dec PO intake 1 week. Per daughter France she is normally AAOx3 and can ambulate w/ walker. Pulse ox with EMS was 70s so brought to ED. Family denied recent fever, cough, SOB, abd pain, N/V/D, sick contacts, CP. Patient was complaining of R foot pain for a while and was pending a visit to the podiatrist, the family did note her R foot was cooler and bluish but not whole leg. Patient was recently found to have chest wall recurrence of breast cancer for which she was taking oral hormone tx.     In ED: T101, HR 95, BP 96/51, RR18, SpO2 100% intubated. In ED pt intubated for airway protection.   Labs significant for WBC 37, Cr 1.5, trop 0.15. UA neg.  CXR possible PNA.   Of note: also found to have mottled cold R leg.     COURSE: pt was transferred to CCU and found to have a diffusely thrombosed R leg that was not amenable to surgical intervention. Per family wishes they did not want her intubated in first place so wished her palliatively extubated.  Family at bedside, tube removed @ 12 mid 21 - pt  12:15am

## 2021-02-22 LAB
CULTURE RESULTS: SIGNIFICANT CHANGE UP
SPECIMEN SOURCE: SIGNIFICANT CHANGE UP

## 2021-02-26 LAB
CULTURE RESULTS: SIGNIFICANT CHANGE UP
CULTURE RESULTS: SIGNIFICANT CHANGE UP
SPECIMEN SOURCE: SIGNIFICANT CHANGE UP
SPECIMEN SOURCE: SIGNIFICANT CHANGE UP

## 2021-03-04 DIAGNOSIS — Z51.5 ENCOUNTER FOR PALLIATIVE CARE: ICD-10-CM

## 2021-03-04 DIAGNOSIS — J18.9 PNEUMONIA, UNSPECIFIED ORGANISM: ICD-10-CM

## 2021-03-04 DIAGNOSIS — I82.413 ACUTE EMBOLISM AND THROMBOSIS OF FEMORAL VEIN, BILATERAL: ICD-10-CM

## 2021-03-04 DIAGNOSIS — E03.9 HYPOTHYROIDISM, UNSPECIFIED: ICD-10-CM

## 2021-03-04 DIAGNOSIS — R62.7 ADULT FAILURE TO THRIVE: ICD-10-CM

## 2021-03-04 DIAGNOSIS — I25.10 ATHEROSCLEROTIC HEART DISEASE OF NATIVE CORONARY ARTERY WITHOUT ANGINA PECTORIS: ICD-10-CM

## 2021-03-04 DIAGNOSIS — N17.9 ACUTE KIDNEY FAILURE, UNSPECIFIED: ICD-10-CM

## 2021-03-04 DIAGNOSIS — J96.01 ACUTE RESPIRATORY FAILURE WITH HYPOXIA: ICD-10-CM

## 2021-03-04 DIAGNOSIS — Z95.5 PRESENCE OF CORONARY ANGIOPLASTY IMPLANT AND GRAFT: ICD-10-CM

## 2021-03-04 DIAGNOSIS — Z88.2 ALLERGY STATUS TO SULFONAMIDES: ICD-10-CM

## 2021-03-04 DIAGNOSIS — H91.90 UNSPECIFIED HEARING LOSS, UNSPECIFIED EAR: ICD-10-CM

## 2021-03-04 DIAGNOSIS — T82.868A THROMBOSIS DUE TO VASCULAR PROSTHETIC DEVICES, IMPLANTS AND GRAFTS, INITIAL ENCOUNTER: ICD-10-CM

## 2021-03-04 DIAGNOSIS — E78.5 HYPERLIPIDEMIA, UNSPECIFIED: ICD-10-CM

## 2021-03-04 DIAGNOSIS — Z88.0 ALLERGY STATUS TO PENICILLIN: ICD-10-CM
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
## 2021-03-04 DIAGNOSIS — C50.919 MALIGNANT NEOPLASM OF UNSPECIFIED SITE OF UNSPECIFIED FEMALE BREAST: ICD-10-CM

## 2021-03-04 DIAGNOSIS — R65.21 SEVERE SEPSIS WITH SEPTIC SHOCK: ICD-10-CM

## 2021-03-04 DIAGNOSIS — Z66 DO NOT RESUSCITATE: ICD-10-CM

## 2021-03-04 DIAGNOSIS — I82.433 ACUTE EMBOLISM AND THROMBOSIS OF POPLITEAL VEIN, BILATERAL: ICD-10-CM

## 2021-03-04 DIAGNOSIS — Z91.040 LATEX ALLERGY STATUS: ICD-10-CM

## 2021-03-04 DIAGNOSIS — Z88.1 ALLERGY STATUS TO OTHER ANTIBIOTIC AGENTS STATUS: ICD-10-CM

## 2021-03-04 DIAGNOSIS — I10 ESSENTIAL (PRIMARY) HYPERTENSION: ICD-10-CM

## 2021-03-04 DIAGNOSIS — A41.9 SEPSIS, UNSPECIFIED ORGANISM: ICD-10-CM
